# Patient Record
Sex: FEMALE | Race: WHITE
[De-identification: names, ages, dates, MRNs, and addresses within clinical notes are randomized per-mention and may not be internally consistent; named-entity substitution may affect disease eponyms.]

---

## 2020-01-08 ENCOUNTER — HOSPITAL ENCOUNTER (INPATIENT)
Dept: HOSPITAL 74 - JLDR | Age: 30
LOS: 4 days | Discharge: HOME | End: 2020-01-12
Attending: OBSTETRICS & GYNECOLOGY | Admitting: OBSTETRICS & GYNECOLOGY
Payer: COMMERCIAL

## 2020-01-08 VITALS — BODY MASS INDEX: 37.5 KG/M2

## 2020-01-08 DIAGNOSIS — O48.0: ICD-10-CM

## 2020-01-08 DIAGNOSIS — O42.92: ICD-10-CM

## 2020-01-08 DIAGNOSIS — Z3A.40: ICD-10-CM

## 2020-01-08 LAB
ALT SERPL-CCNC: 28 U/L (ref 13–61)
ANION GAP SERPL CALC-SCNC: 10 MMOL/L (ref 8–16)
APTT BLD: 28.2 SECONDS (ref 25.2–36.5)
AST SERPL-CCNC: 29 U/L (ref 15–37)
BASOPHILS # BLD: 0.2 % (ref 0–2)
BUN SERPL-MCNC: 8.4 MG/DL (ref 7–18)
CALCIUM SERPL-MCNC: 9 MG/DL (ref 8.5–10.1)
CHLORIDE SERPL-SCNC: 108 MMOL/L (ref 98–107)
CO2 SERPL-SCNC: 20 MMOL/L (ref 21–32)
CREAT SERPL-MCNC: 0.9 MG/DL (ref 0.55–1.3)
DEPRECATED RDW RBC AUTO: 14.4 % (ref 11.6–15.6)
EOSINOPHIL # BLD: 0.4 % (ref 0–4.5)
GLUCOSE SERPL-MCNC: 86 MG/DL (ref 74–106)
HCT VFR BLD CALC: 33.9 % (ref 32.4–45.2)
HGB BLD-MCNC: 11.3 GM/DL (ref 10.7–15.3)
INR BLD: 0.85 (ref 0.83–1.09)
LYMPHOCYTES # BLD: 26.6 % (ref 8–40)
MCH RBC QN AUTO: 29.2 PG (ref 25.7–33.7)
MCHC RBC AUTO-ENTMCNC: 33.4 G/DL (ref 32–36)
MCV RBC: 87.5 FL (ref 80–96)
MONOCYTES # BLD AUTO: 7.5 % (ref 3.8–10.2)
NEUTROPHILS # BLD: 65.3 % (ref 42.8–82.8)
PLATELET # BLD AUTO: 220 K/MM3 (ref 134–434)
PLATELET BLD QL SMEAR: ADEQUATE
PMV BLD: 9.8 FL (ref 7.5–11.1)
POTASSIUM SERPLBLD-SCNC: 4.3 MMOL/L (ref 3.5–5.1)
PT PNL PPP: 10 SEC (ref 9.7–13)
RBC # BLD AUTO: 3.87 M/MM3 (ref 3.6–5.2)
RETICS # AUTO: 2.27 % (ref 0.5–1.5)
SODIUM SERPL-SCNC: 139 MMOL/L (ref 136–145)
URATE SERPL-SCNC: 7 MG/DL (ref 2.6–7.2)
WBC # BLD AUTO: 9 K/MM3 (ref 4–10)

## 2020-01-08 RX ADMIN — SODIUM CHLORIDE, SODIUM GLUCONATE, SODIUM ACETATE, POTASSIUM CHLORIDE, AND MAGNESIUM CHLORIDE SCH MLS/HR: 526; 502; 368; 37; 30 INJECTION, SOLUTION INTRAVENOUS at 22:00

## 2020-01-08 NOTE — HP
Past Medical History





- Admission


Chief Complaint: post date , oligo for induction


History of Present Illness: 





borderline gdm 


History Source: Patient


Limitations to Obtaining History: No Limitations





- Past Medical History


CNS: No: Alzheimer's, CVA, Dementia, Migraine, Multiple Sclerosis, Peripheral 

Neuropathy, Parkinson's, Seizure, Syncope, TIA, Vertigo, Other


Cardiovascular: No: AFIB, Aneurysm, Aortic Insufficiency, Aortic Stenosis, CAD, 

CHF, Deep Vein Thrombosis, HTN, Hyperlipdemia, MI, Mitral Insufficiency, Mitral 

Stenosis, Murmur, Pulmonary Hypertension, Other


Pulmonary: No: Asthma, Bronchitis, Cancer, COPD, O2 Dependent, Pneumonia, 

Previously Intubated, Pulmonary Embolus, Pulmonary Fibrosis, Sleep Apnea, Other


Gastrointestinal: No: Ascites, Cancer, Constipation, Crohn's Disease, 

Diverticulitis, Diverticulosis, Esophageal Varices, Gastritis, GERD, GI Bleed, 

Hemorrhoids, Hiatal Hernia, Inflamatory Bowel Disease, Irritable Bowel Disease, 

Pancreatitis, Peptic Ulcer Disease, Ulcerative Colitis, Other


Hepatobiliary: No: Cirrhosis, Cholelithiasis, Cholecystitis, Choledocholithiasis

, Hepatitis A, Hepatitis B, Hepatitis C, Other


Renal/: No: Renal Failure, Renal Inusuff, BPH, Cancer, Hematuria, Hemodialysis

, Neurogenic Bladder, Renal Calculi, UTI, Other


Reproductive: No: Ectopic Pregnancy, Endometriosis, Fibroids, PID, Polycystic 

Ovary Syndrome, Postmenopausal, Other


...: 1


...Para: 0


...Term: 0


...: 0


...Spon : 0


...Induced : 0


...Multiple Gestation: 0


...LMP: 19


... Weeks Gestation by Dates: 40.1


...EDC by Dates: 20


Heme/Onc: No: Anemia, B12 Deficiency, Bleeding Disorder, Cancer, Current 

Chemotherapy, Current Radiation Therapy, Hemochromatosis, Hypercoaguable State, 

Myeloproliferative Synd, Sickle Cell Disease, Sickle Cell Trait, 

Thrombocytopenia, Other


Infectious Disease: No: AIDS, C-Diff, Herpes Zoster, HIV, MRSA, STD's, 

Tuberculosis, VREF, Other


Psych: No: Addictions, Anxiety, Bipolar, Depression, Panic, Psychosis, 

Schizophrenia, Other


Musculoskeletal: No: Bursitis, Chronic low back pain, Hemiparesis, Hemiplegia, 

Osteoarthritis, Paraplegia, Other


Rheumatology: No: Fibromyalgia, Gout, Lupus, Rheumatoid Arthritis, Sarcoidosis, 

Vasculitis, Other


ENT: No: Allergic Rhinitis, Sinusitis, Other


Endocrine: No: Paterson's Disease, Cushing's Disease, Diabetes Insipidus, 

Diabetes Mellitus, Hyperparathyroidism, Hyperthyroidism, Hypothyroidism, 

Osteopenia, SIADH, Other


Dermatology: No: Basal Cell, Cellulitis, Eczema, Melanoma, Psoriasis, Squamous 

Cell, Other





- Past Surgical History


Past Surgical History: No: None, AAA Repair, AICD, Amputation, Appendectomy, 

Arthrosocopy, AV Fistula/Graft, Bariatric Surgery, Breast Biopsy, Bypass, CABG, 

Carotid Endarterectomy, Cataract Removal, Cholecystectomy, Colectomy, 

Colonoscopy, Colostomy, Craniotomy, , Cystectomy, Hernia Repair, 

Hysterectomy, Ileal Conduit, Ileosotomy, Joint Replacement, Kidney Transplant, 

Laminectomy, Liver Transplant, Mastectomy, Nephrectomy, Oopherectomy, 

Orchiectomy, Permanent Pacemaker, Prostatectomy, Splenectomy, Stent, Thoracotomy

, TURP, Tonsillectomy, Tubal Ligation, Upper Endoscopy, Valve Replacement, 

Vasectomy, Vein Stripping/Ligation


Hx Myomectomy: No


Hx Transabdominal Cerclage: No





- Advance Directives


Advance Directives: Yes: Living Will





- Smoking History


Smoking history: Never smoked


Have you smoked in the past 12 months: No





- Alcohol/Substance Use


Hx Alcohol Use: No


History of Substance Use: reports: None





- Social History


Usual Living Arrangement: Yes: With Significant Other


Do you think of yourself as: Straight/Heterosexual


ADL: Independent


History of Recent Travel: No





Home Medications





- Allergies


Allergies/Adverse Reactions: 


 Allergies











Allergy/AdvReac Type Severity Reaction Status Date / Time


 


No Known Drug Allergies Allergy   Verified 20 22:11














Family Medical History


Family History: Denies





Review of Systems





- Review of Systems


Constitutional: reports: No Symptoms


Eyes: reports: No Symptoms


HENT: reports: No Symptoms


Neck: reports: No Symptoms


Cardiovascular: reports: No Symptoms


Respiratory: reports: No Symptoms


Gastrointestinal: reports: No Symptoms


Genitourinary: reports: No Symptoms


Breasts: reports: No Symptoms Reported


Musculoskeletal: reports: No Symptoms


Integumentary: reports: No Symptoms


Neurological: reports: No Symptoms


Endocrine: reports: No Symptoms


Hematology/Lymphatic: reports: No Symptoms


Psychiatric: reports: No Symptoms





Physical Exam - Maternity


Vital Signs: 


 Vital Signs











Temperature  98.1 F   20 21:05


 


Pulse Rate  64   20 22:57


 


Respiratory Rate  18   20 22:57


 


Blood Pressure  132/93   20 22:57


 


O2 Sat by Pulse Oximetry (%)      











Constitutional: Yes: Well Nourished, No Distress, Calm


Eyes: Yes: WNL, Conjunctiva Clear, EOM Intact


HENT: Yes: WNL, Atraumatic, Normocephalic


Neck: Yes: WNL, Supple, Trachea Midline


Cardiovascular: Yes: WNL, Regular Rate and Rhythm


Lungs: Clear to auscultation


Breast(s): Yes: WNL





- Abdominal Exam/OB


Fundal Height: 40


Number of Fetuses: Single


Fetal Presentation: Vertex


Regularity: Irregular


Intensity: Mild


Fetal Monitor Mode: External


Fetal Heart Rate Location: [facility]


Category: I


Accelerations: Uniform


Decelerations: None





- Vaginal Exam/OB


Vaginal Bleediing: No


Speculum Exam: No


Dilatation (cm): 1


Effacement (%): 50


Amniotic Membrane Status: Intact


Fetal Presentation: Vertex/Position


Fetal Station: -2





- Physical Exam


Musculoskeletal: Yes: WNL


Extremities: Yes: WNL


Edema: Yes


Edema: LUE: 1+, RUE: 1+, LLE: 1+, RLE: 1+


Integumentary: Yes: WNL


Deep Tendon Reflex Grade: Normal +2


...Motor Strength: WNL


Psychiatric: Yes: WNL, Alert, Oriented





- Labs


Lab Results: 


 CBC, BMP





 20 22:15 











Hemorrhage Risk Assessment





- Risk Factors


Medium Risk Factors: Yes: None


High Risk Factors: Yes: None


Risk Score: 1


Risk Level: Medium Risk





Assessment/Plan





for cervidil , then pitocin, and arom

## 2020-01-09 LAB
GGT SERPL-CCNC: 17 U/L (ref 5–85)
PH SPEC: (no result) [PH]

## 2020-01-09 RX ADMIN — Medication SCH MLS/HR: at 06:20

## 2020-01-09 RX ADMIN — Medication SCH MLS/HR: at 23:00

## 2020-01-09 NOTE — PN
Progress Note (short form)





- Note


Progress Note: 





8 am, 2 cm, -2, 60 %, srom, uc q 5 mi, asking for stadol , nst reactive

## 2020-01-09 NOTE — PN
Progress Note (short form)





- Note


Progress Note: 





10 am 2 cm -2, 60%, asking for stadol, uc q 3 to 5 min, nst reactive

## 2020-01-09 NOTE — PN
Progress Note (short form)





- Note


Progress Note: 





845 pm, cervix 4 cm, -2, 60%, no cervical change, uc q 3 min, alrearey 18 hrs 

srom, no cervical change x 7hrs, will proceed to c s

## 2020-01-09 NOTE — OP
Operative Note





- Note:


Operative Date: 01/09/20


Pre-Operative Diagnosis: f t progress


Operation: primary lt c s


Post-Operative Diagnosis: Same as Pre-op


Surgeon: Omar Santos


Assistant: Michael Sheppard


Anesthesiologist/CRNA: Dayanara Su


Anesthesia: Epidural


Estimated Blood Loss (mls): 800 (no complications )


Operative Report Dictated: Yes

## 2020-01-09 NOTE — PN
Progress Note (short form)





- Note


Progress Note: 





230 pm, 4 cm, -2, 705, nst reactive, uc q 3 min, comfort w epidural , continue 

pitocin

## 2020-01-09 NOTE — PN
Progress Note (short form)





- Note


Progress Note: 





615 pm 4cm, -2, 60%, no cervical change, uc q 3 min, continue pitocin, nst 

reactive

## 2020-01-10 LAB
BASOPHILS # BLD: 0.2 % (ref 0–2)
DEPRECATED RDW RBC AUTO: 14.7 % (ref 11.6–15.6)
EOSINOPHIL # BLD: 0.2 % (ref 0–4.5)
HCT VFR BLD CALC: 29.2 % (ref 32.4–45.2)
HGB BLD-MCNC: 9.4 GM/DL (ref 10.7–15.3)
LYMPHOCYTES # BLD: 13.1 % (ref 8–40)
MCH RBC QN AUTO: 28.5 PG (ref 25.7–33.7)
MCHC RBC AUTO-ENTMCNC: 32.1 G/DL (ref 32–36)
MCV RBC: 88.7 FL (ref 80–96)
MONOCYTES # BLD AUTO: 5.1 % (ref 3.8–10.2)
NEUTROPHILS # BLD: 81.4 % (ref 42.8–82.8)
PLATELET # BLD AUTO: 190 K/MM3 (ref 134–434)
PMV BLD: 9.6 FL (ref 7.5–11.1)
RBC # BLD AUTO: 3.3 M/MM3 (ref 3.6–5.2)
WBC # BLD AUTO: 12.9 K/MM3 (ref 4–10)

## 2020-01-10 RX ADMIN — IBUPROFEN PRN MG: 600 TABLET, FILM COATED ORAL at 13:35

## 2020-01-10 RX ADMIN — SIMETHICONE CHEW TAB 80 MG PRN MG: 80 TABLET ORAL at 13:20

## 2020-01-10 RX ADMIN — Medication SCH: at 19:52

## 2020-01-10 RX ADMIN — ACETAMINOPHEN PRN MG: 325 TABLET ORAL at 13:36

## 2020-01-10 RX ADMIN — SODIUM CHLORIDE, SODIUM GLUCONATE, SODIUM ACETATE, POTASSIUM CHLORIDE, AND MAGNESIUM CHLORIDE SCH: 526; 502; 368; 37; 30 INJECTION, SOLUTION INTRAVENOUS at 19:51

## 2020-01-10 RX ADMIN — ENOXAPARIN SODIUM SCH MG: 40 INJECTION SUBCUTANEOUS at 10:05

## 2020-01-10 NOTE — PN
Post Partum Progress Note


Post Partum Day: 1


Type of Delivery: Primary C/S


Vital Signs: 


 Vital Signs











Temperature  98.2 F   01/10/20 12:00


 


Pulse Rate  74   01/10/20 12:00


 


Respiratory Rate  20   01/10/20 14:00


 


Blood Pressure  126/70   01/10/20 12:00


 


O2 Sat by Pulse Oximetry (%)  98   01/09/20 23:30











Breast Exam: Yes: Soft


Uterus: Yes: Fundus Firm, Fundus below umbilicus


Incision: Yes: Dressing dry and intact, Sutures intact


Abdomen/GI: Yes: Abdomen soft, Passing flatus, Tolerating PO


Lochia: Yes: Serosa


Lochia, amount: Small


Extremities: Yes: Calves non-tender


Perineum: Yes: Intact


Activity: Ambulating (doing well )





- Labs


Labs: 


 CBC











WBC  12.9 K/mm3 (4.0-10.0)  H  01/10/20  08:19    


 


RBC  3.30 M/mm3 (3.60-5.2)  L  01/10/20  08:19    


 


Hgb  9.4 GM/dL (10.7-15.3)  L  01/10/20  08:19    


 


Hct  29.2 % (32.4-45.2)  L  01/10/20  08:19    


 


MCV  88.7 fl (80-96)   01/10/20  08:19    


 


MCH  28.5 pg (25.7-33.7)   01/10/20  08:19    


 


MCHC  32.1 g/dl (32.0-36.0)   01/10/20  08:19    


 


RDW  14.7 % (11.6-15.6)   01/10/20  08:19    


 


Plt Count  190 K/MM3 (134-434)   01/10/20  08:19    


 


MPV  9.6 fl (7.5-11.1)   01/10/20  08:19    


 


Absolute Neuts (auto)  10.5 K/mm3 (1.5-8.0)  H  01/10/20  08:19    


 


Neutrophils %  81.4 % (42.8-82.8)  D 01/10/20  08:19    


 


Lymphocytes %  13.1 % (8-40)  D 01/10/20  08:19    


 


Monocytes %  5.1 % (3.8-10.2)   01/10/20  08:19    


 


Eosinophils %  0.2 % (0-4.5)   01/10/20  08:19    


 


Basophils %  0.2 % (0-2.0)   01/10/20  08:19    


 


Nucleated RBC %  0 % (0-0)   01/10/20  08:19    


 


Platelet Estimate  Adequate   01/08/20  22:15    


 


Platelet Comment  Rare giant plts   01/08/20  22:15    


 


Retic Count  2.27 % (0.5-1.5)  H  01/08/20  22:15    


 


Haptoglobin  78 mg/dL ()   01/08/20  22:15

## 2020-01-10 NOTE — PN
Progress Note, Physician


Chief Complaint: 





s/p csection under epidural anesthesia postop day one


History of Present Illness: 





epidural duramorph for post op analgesia





- Current Medication List


Current Medications: 


Active Medications





Acetaminophen (Tylenol -)  650 mg PO Q4H PRN


   PRN Reason: FEVER


Bisacodyl (Dulcolax Suppository -)  10 mg RC PRN PRN


   PRN Reason: CONSTIPATION


Diphenhydramine HCl (Benadryl Injection -)  25 mg IVPUSH Q4H PRN


   PRN Reason: Pruritis


Enoxaparin Sodium (Lovenox -)  40 mg SQ DAILY [facility]


Parenteral Electrolytes (Plasma-Lyte 148 -)  1,000 mls @ 125 mls/hr IV ASDIR [facility]


   Last Admin: 01/08/20 22:00 Dose:  125 mls/hr


Oxytocin/Sodium Chloride (Normal Saline+20 Units Oxytocin -)  20 unit in 1,000 

mls @ 125 mls/hr IV ASDIR [facility]


   Last Admin: 01/09/20 23:00 Dose:  125 mls/hr


Ibuprofen (Motrin -)  600 mg PO Q4H PRN


   PRN Reason: PAIN LEVEL 1 - 3


Ibuprofen (Caldolor Injection -)  800 mg IVPB Q8H PRN


   PRN Reason: PAIN LEVEL 6-10


Methylergonovine Maleate (Methergine Injection -)  0.2 mg IM Q4H PRN


   PRN Reason: Excessive Bleeding (L&D)


Naloxone HCl (Narcan -)  0.4 mg IVPUSH PRN PRN


   PRN Reason: Sedation


Ondansetron HCl (Zofran Injection)  4 mg IVPUSH Q4H PRN


   PRN Reason: NAUSEA


Oxycodone HCl (Roxicodone -)  5 mg PO Q4H PRN


   PRN Reason: PAIN LEVEL 4 - 6


Oxycodone HCl (Roxicodone -)  10 mg PO Q4H PRN


   PRN Reason: PAIN LEVEL 7 - 10


Senna/Docusate Sodium (Pericolace -)  2 tablet PO HS PRN


   PRN Reason: CONSTIPATION


Simethicone (Mylicon -)  80 mg PO Q4H PRN


   PRN Reason: GAS


Zolpidem Tartrate (Ambien -)  5 mg PO HS PRN


   PRN Reason: INSOMNIA


   Last Admin: 01/09/20 02:20 Dose:  5 mg











- Objective


Vital Signs: 


 Vital Signs











Temperature  98.9 F   01/10/20 08:00


 


Pulse Rate  77   01/10/20 08:00


 


Respiratory Rate  20   01/10/20 08:00


 


Blood Pressure  134/86   01/10/20 08:00


 


O2 Sat by Pulse Oximetry (%)  98   01/09/20 23:30











Constitutional: Yes: Well Nourished


Cardiovascular: Yes: WNL


Respiratory: Yes: WNL


Gastrointestinal: Yes: WNL


Labs: 


 CBC, BMP





 01/10/20 08:19 





 01/08/20 22:15 





 INR, PTT











INR  0.85  (0.83-1.09)   01/08/20  22:15    














Assessment/Plan





No adverse effects of anesthetic, pain controlled, dept of anesthesiology will 

sign off care at this time

## 2020-01-10 NOTE — OP
DATE OF OPERATION:  2020

 

PREOPERATIVE DIAGNOSIS:  Failure to progress.  

 

POSTOPERATIVE DIAGNOSIS:  Occiput posterior.    

 

PROCEDURE:  Primary low transverse  section.  

 

SURGEON:  Omar Santos MD. 

 

ASSISTANT:  PADMINI Chandler.

 

ANESTHESIOLOGIST:  Dayanara Su MD.  

 

INDICATION:  This is a 29-year-old female patient, oligohydramnios, 40 weeks and 2

days pregnant, was brought into the hospital for induction process of labor, and

patient came in on .  Patient had a Cervidil for the morning 8 hours, and

after Cervidil patient had a contraction spontaneously, then spontaneous rupture of

membrane around 3:30 in the morning of .  So patient started having

contractions by herself more aggressively, and every 4 to 5 hours the patient

received augmentation Pitocin around 6 o'clock on , and patient's cervix

progressed to 2 cm around 8 o'clock in the morning, 60%, -2, and patient has

continued to receive Pitocin, and patient asked for pain medication, and patient

received Stadol IV pain medication around 10 o'clock in the morning, and patient

continued laboring, and around 2 o'clock patient received epidural, cervix was about

3 cm, 60%, -2, and patient continued to receive Pitocin, and was laboring with

epidural.  Around 6:30 patient's cervix still remained the same, 3 to 4 cm and 60%,

-2, and around 8:45 cervix still remained about 3 to 4 cm, no cervical change.  The

patient had spontaneous rupture around 18 hours at this time, and the cervix still

_____ options and risks and benefits explained to the patient for  section,

and patient agreed and patient taken to OR for failure to progress for primary low

transverse  section.  Patient already had epidural, so patient received

top-off for re-dose of epidural anesthesia.  The patient was comfortable.  

 

DESCRIPTION OF PROCEDURE:  Patient was placed on operating table in supine position. 

The patient's abdomen and pelvis were prepped and draped in the usual sterile manner.

 Pfannenstiel incision was made.  The incision was made through skin, subcutaneous

tissue, until the fascia was nicked in the midline.  The fascia was extended

bilaterally.  Intraperitoneal cavity was entered, bladder flap was created.  Low

transverse segment was entered, baby was delivered from OP position.  Baby was handed

over to the pediatrician after umbilical cord was doubly clamped and cut.  Placenta

was removed.  Uterus was closed in single layer, first layer interlocking Vicryl

suture, good hemostasis, and both gutters were cleaned.  Bladder flap was closed and

draining clear urine.  Blood loss about 800 mL.  Patient tolerated procedure well. 

No complications.  Both ovaries, fallopian tubes, uterus were within normal limits. 

Patient was transferred to recovery room in stable condition draining clear urine.   

 

 

MD ZAYNAB OBANDO/[id number]

DD: 2020 23:01

DT: 01/10/2020 00:21

Job #:  [id number]

## 2020-01-11 RX ADMIN — IBUPROFEN PRN MG: 600 TABLET, FILM COATED ORAL at 15:44

## 2020-01-11 RX ADMIN — SIMETHICONE CHEW TAB 80 MG PRN MG: 80 TABLET ORAL at 10:36

## 2020-01-11 RX ADMIN — ACETAMINOPHEN PRN MG: 325 TABLET ORAL at 10:37

## 2020-01-11 RX ADMIN — IBUPROFEN PRN MG: 600 TABLET, FILM COATED ORAL at 10:36

## 2020-01-11 RX ADMIN — IBUPROFEN PRN MG: 600 TABLET, FILM COATED ORAL at 20:25

## 2020-01-11 RX ADMIN — ACETAMINOPHEN PRN MG: 325 TABLET ORAL at 00:01

## 2020-01-11 RX ADMIN — ACETAMINOPHEN PRN MG: 325 TABLET ORAL at 15:45

## 2020-01-11 RX ADMIN — ACETAMINOPHEN PRN MG: 325 TABLET ORAL at 05:47

## 2020-01-11 RX ADMIN — SIMETHICONE CHEW TAB 80 MG PRN MG: 80 TABLET ORAL at 20:25

## 2020-01-11 RX ADMIN — ENOXAPARIN SODIUM SCH MG: 40 INJECTION SUBCUTANEOUS at 10:14

## 2020-01-11 RX ADMIN — IBUPROFEN PRN MG: 600 TABLET, FILM COATED ORAL at 05:47

## 2020-01-11 RX ADMIN — ACETAMINOPHEN PRN MG: 325 TABLET ORAL at 20:26

## 2020-01-11 RX ADMIN — SIMETHICONE CHEW TAB 80 MG PRN MG: 80 TABLET ORAL at 00:00

## 2020-01-11 RX ADMIN — Medication SCH: at 00:02

## 2020-01-11 RX ADMIN — IBUPROFEN PRN MG: 600 TABLET, FILM COATED ORAL at 00:01

## 2020-01-11 RX ADMIN — SIMETHICONE CHEW TAB 80 MG PRN MG: 80 TABLET ORAL at 15:44

## 2020-01-11 RX ADMIN — SIMETHICONE CHEW TAB 80 MG PRN MG: 80 TABLET ORAL at 05:46

## 2020-01-11 NOTE — PN
Post Partum Progress Note


Type of Delivery: Primary C/S


Vital Signs: 


 Vital Signs











Temperature  97.6 F   01/10/20 21:36


 


Pulse Rate  69   01/10/20 21:36


 


Respiratory Rate  20   01/10/20 21:36


 


Blood Pressure  129/88   01/10/20 21:36


 


O2 Sat by Pulse Oximetry (%)  98   20 23:30











Breast Exam: Yes: Soft


Incision: Yes: Sutures intact


Abdomen/GI: Yes: Abdomen soft


Lochia: Yes: Rubra


Lochia, amount: Small


Extremities: Yes: Calves non-tender





- Labs


Labs: 


 CBC











WBC  12.9 K/mm3 (4.0-10.0)  H  01/10/20  08:19    


 


RBC  3.30 M/mm3 (3.60-5.2)  L  01/10/20  08:19    


 


Hgb  9.4 GM/dL (10.7-15.3)  L  01/10/20  08:19    


 


Hct  29.2 % (32.4-45.2)  L  01/10/20  08:19    


 


MCV  88.7 fl (80-96)   01/10/20  08:19    


 


MCH  28.5 pg (25.7-33.7)   01/10/20  08:19    


 


MCHC  32.1 g/dl (32.0-36.0)   01/10/20  08:19    


 


RDW  14.7 % (11.6-15.6)   01/10/20  08:19    


 


Plt Count  190 K/MM3 (134-434)   01/10/20  08:19    


 


MPV  9.6 fl (7.5-11.1)   01/10/20  08:19    


 


Absolute Neuts (auto)  10.5 K/mm3 (1.5-8.0)  H  01/10/20  08:19    


 


Neutrophils %  81.4 % (42.8-82.8)  D 01/10/20  08:19    


 


Lymphocytes %  13.1 % (8-40)  D 01/10/20  08:19    


 


Monocytes %  5.1 % (3.8-10.2)   01/10/20  08:19    


 


Eosinophils %  0.2 % (0-4.5)   01/10/20  08:19    


 


Basophils %  0.2 % (0-2.0)   01/10/20  08:19    


 


Nucleated RBC %  0 % (0-0)   01/10/20  08:19    


 


Platelet Estimate  Adequate   20  22:15    


 


Platelet Comment  Rare giant plts   20  22:15    


 


Retic Count  2.27 % (0.5-1.5)  H  20  22:15    


 


Haptoglobin  78 mg/dL ()   20  22:15    














Problem List





- Problems


(1)  delivery delivered


Code(s): O82 - ENCOUNTER FOR  DELIVERY WITHOUT INDICATION   





Assessment/Plan





Exam WNL.


Healing well.


BS pos. Flatus PO.


No CVA, extrem T.





No nursing.


Breast care discussed. 


Instructions given.


Discharge tomorrow AM.

## 2020-01-12 VITALS — SYSTOLIC BLOOD PRESSURE: 127 MMHG | DIASTOLIC BLOOD PRESSURE: 86 MMHG | HEART RATE: 90 BPM

## 2020-01-12 VITALS — TEMPERATURE: 98 F

## 2020-01-12 LAB
BASOPHILS # BLD: 0.2 % (ref 0–2)
DEPRECATED RDW RBC AUTO: 14.7 % (ref 11.6–15.6)
EOSINOPHIL # BLD: 0.9 % (ref 0–4.5)
HCT VFR BLD CALC: 26.8 % (ref 32.4–45.2)
HGB BLD-MCNC: 8.9 GM/DL (ref 10.7–15.3)
LYMPHOCYTES # BLD: 17.1 % (ref 8–40)
MCH RBC QN AUTO: 29.1 PG (ref 25.7–33.7)
MCHC RBC AUTO-ENTMCNC: 33.2 G/DL (ref 32–36)
MCV RBC: 87.6 FL (ref 80–96)
MONOCYTES # BLD AUTO: 5.1 % (ref 3.8–10.2)
NEUTROPHILS # BLD: 76.7 % (ref 42.8–82.8)
PLATELET # BLD AUTO: 262 K/MM3 (ref 134–434)
PMV BLD: 8.4 FL (ref 7.5–11.1)
RBC # BLD AUTO: 3.06 M/MM3 (ref 3.6–5.2)
WBC # BLD AUTO: 10.9 K/MM3 (ref 4–10)

## 2020-01-12 RX ADMIN — ENOXAPARIN SODIUM SCH MG: 40 INJECTION SUBCUTANEOUS at 10:00

## 2020-01-12 RX ADMIN — IBUPROFEN PRN MG: 600 TABLET, FILM COATED ORAL at 10:30

## 2020-01-12 RX ADMIN — SIMETHICONE CHEW TAB 80 MG PRN MG: 80 TABLET ORAL at 00:13

## 2020-01-12 RX ADMIN — ACETAMINOPHEN PRN MG: 325 TABLET ORAL at 05:39

## 2020-01-12 RX ADMIN — IBUPROFEN PRN MG: 600 TABLET, FILM COATED ORAL at 05:39

## 2020-01-12 RX ADMIN — IBUPROFEN PRN MG: 600 TABLET, FILM COATED ORAL at 00:13

## 2020-01-12 RX ADMIN — SIMETHICONE CHEW TAB 80 MG PRN MG: 80 TABLET ORAL at 05:39

## 2020-01-12 RX ADMIN — ACETAMINOPHEN PRN MG: 325 TABLET ORAL at 00:13

## 2020-01-12 RX ADMIN — ACETAMINOPHEN PRN MG: 325 TABLET ORAL at 10:31

## 2020-01-12 RX ADMIN — SIMETHICONE CHEW TAB 80 MG PRN MG: 80 TABLET ORAL at 10:30

## 2020-01-12 NOTE — PN
Progress Note (short form)





- Note


Progress Note: 





pod 3 doing well, no c/o . passing gas , no dizziness


 CBC, BMP





 01/12/20 10:27 





 01/08/20 22:15 





abdomen soft, no distension, no cva 


incision dry, clean 


no calf tenderness


no excess vaginal bleeding


plan d/c home, follow up office 1 week 


cont iron, vit

## 2020-01-13 NOTE — PATH
Surgical Pathology Report



Patient Name:  ARNOLD DEL REAL

Accession #:  

Med. Rec. #:  [id number]                                                        

   /Age/Gender:  1990 (Age: 29) / F

Account:  [id number]                                                          

             Location: [facility] OBS[facility]N

Taken:  2020

Received:  1/10/2020

Reported:  2020

Physicians:  Omar Santos MD

  



Specimen(s) Received

 PLACENTA 





Clinical History

Prolonged rupture of membranes and failure to progress, primary 







Final Diagnosis

PLACENTA, DELIVERY:

FOCALLY DISRUPTED THIRD TRIMESTER PLACENTA WITH THREE VESSEL UMBILICAL CORD AND

UNREMARKABLE PLACENTAL MEMBRANES.  





***Electronically Signed***

Familia Cardona M.D.





Gross Description

The specimen is received fresh labeled placenta and is a 612 gram, 18.4 x 13.0 x

2.8 cm. placenta with attached membranes and umbilical cord.  The attached

membranes are tan, translucent with focal opacities and insert marginally.  The

umbilical cord measures 40 cm. in length and averages 1 cm. in diameter.  The

cord inserts eccentrically, 5 cm. to the nearest margin.  No true knots or

strictures are identified. Cut surface of the umbilical cord reveals 3 vessels.

The fetal surface is grey-blue with minimal fibrin deposition and appropriate

caliber vessels.  The maternal surface is red-brown with focal defects. 

Sectioning reveals red-brown, spongy parenchyma.  No lesions are identified. 

Representative sections are submitted in three cassettes as follows: 1- membrane

rolls and umbilical cord; 2-3- full thickness sections of placenta.

DL/1/10/2020



saudi/1/10/2020